# Patient Record
Sex: MALE
[De-identification: names, ages, dates, MRNs, and addresses within clinical notes are randomized per-mention and may not be internally consistent; named-entity substitution may affect disease eponyms.]

---

## 2020-03-15 ENCOUNTER — TRANSCRIPTION ENCOUNTER (OUTPATIENT)
Age: 50
End: 2020-03-15

## 2022-02-07 PROBLEM — Z00.00 ENCOUNTER FOR PREVENTIVE HEALTH EXAMINATION: Status: ACTIVE | Noted: 2022-02-07

## 2022-02-08 ENCOUNTER — APPOINTMENT (OUTPATIENT)
Dept: OTOLARYNGOLOGY | Facility: CLINIC | Age: 52
End: 2022-02-08
Payer: COMMERCIAL

## 2022-02-08 VITALS
BODY MASS INDEX: 27.59 KG/M2 | SYSTOLIC BLOOD PRESSURE: 148 MMHG | HEIGHT: 74 IN | WEIGHT: 215 LBS | TEMPERATURE: 98.6 F | HEART RATE: 96 BPM | DIASTOLIC BLOOD PRESSURE: 96 MMHG

## 2022-02-08 PROCEDURE — 92557 COMPREHENSIVE HEARING TEST: CPT

## 2022-02-08 PROCEDURE — 99204 OFFICE O/P NEW MOD 45 MIN: CPT

## 2022-02-08 PROCEDURE — 92567 TYMPANOMETRY: CPT

## 2022-02-08 RX ORDER — PREDNISONE 10 MG/1
10 TABLET ORAL
Qty: 45 | Refills: 0 | Status: ACTIVE | COMMUNITY
Start: 2022-02-08 | End: 1900-01-01

## 2022-02-08 RX ORDER — AMLODIPINE BESYLATE 10 MG/1
10 TABLET ORAL
Refills: 0 | Status: ACTIVE | COMMUNITY

## 2022-02-08 NOTE — ASSESSMENT
[FreeTextEntry1] : Patient with one week hx of sudden onset of right tinnitus - hissing.  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  Recommended MRi and also started on prednisone after all r and a discussed - follow up in about 2 weeks - if no better would consider eval for IT therapy.

## 2022-02-08 NOTE — HISTORY OF PRESENT ILLNESS
[de-identified] : c/o hissing in right ear for one week.  Started suddenly. No other sx. Does have prior hx of PND.  Hx of allergies in past.  No prior ear problems.  No change in hearing.  Tried cerumen drops without help. Not pulsatile.

## 2022-02-08 NOTE — DATA REVIEWED
[de-identified] : Asymmetry noted\par Left ear-Mild/mod HF SNHL; Type A\par Right ear-normal to mod.sev SNHL; Type A

## 2022-02-10 ENCOUNTER — EMERGENCY (EMERGENCY)
Facility: HOSPITAL | Age: 52
LOS: 0 days | Discharge: HOME | End: 2022-02-10
Attending: STUDENT IN AN ORGANIZED HEALTH CARE EDUCATION/TRAINING PROGRAM
Payer: COMMERCIAL

## 2022-02-10 VITALS
SYSTOLIC BLOOD PRESSURE: 137 MMHG | RESPIRATION RATE: 16 BRPM | OXYGEN SATURATION: 100 % | HEART RATE: 87 BPM | TEMPERATURE: 98 F | DIASTOLIC BLOOD PRESSURE: 81 MMHG

## 2022-02-10 VITALS
OXYGEN SATURATION: 100 % | DIASTOLIC BLOOD PRESSURE: 78 MMHG | RESPIRATION RATE: 17 BRPM | SYSTOLIC BLOOD PRESSURE: 130 MMHG | HEART RATE: 82 BPM | TEMPERATURE: 98 F

## 2022-02-10 DIAGNOSIS — T50.901A POISONING BY UNSPECIFIED DRUGS, MEDICAMENTS AND BIOLOGICAL SUBSTANCES, ACCIDENTAL (UNINTENTIONAL), INITIAL ENCOUNTER: ICD-10-CM

## 2022-02-10 DIAGNOSIS — T36.0X1A POISONING BY PENICILLINS, ACCIDENTAL (UNINTENTIONAL), INITIAL ENCOUNTER: ICD-10-CM

## 2022-02-10 DIAGNOSIS — I10 ESSENTIAL (PRIMARY) HYPERTENSION: ICD-10-CM

## 2022-02-10 DIAGNOSIS — R42 DIZZINESS AND GIDDINESS: ICD-10-CM

## 2022-02-10 DIAGNOSIS — E78.5 HYPERLIPIDEMIA, UNSPECIFIED: ICD-10-CM

## 2022-02-10 DIAGNOSIS — R07.9 CHEST PAIN, UNSPECIFIED: ICD-10-CM
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
## 2022-02-10 DIAGNOSIS — R10.9 UNSPECIFIED ABDOMINAL PAIN: ICD-10-CM

## 2022-02-10 LAB
ALBUMIN SERPL ELPH-MCNC: 3.7 G/DL — SIGNIFICANT CHANGE UP (ref 3.3–5)
ALP SERPL-CCNC: 68 U/L — SIGNIFICANT CHANGE UP (ref 40–120)
ALT FLD-CCNC: 24 U/L — SIGNIFICANT CHANGE UP (ref 12–78)
ANION GAP SERPL CALC-SCNC: 5 MMOL/L — SIGNIFICANT CHANGE UP (ref 5–17)
AST SERPL-CCNC: 11 U/L — LOW (ref 15–37)
BASOPHILS # BLD AUTO: 0.04 K/UL — SIGNIFICANT CHANGE UP (ref 0–0.2)
BASOPHILS NFR BLD AUTO: 0.6 % — SIGNIFICANT CHANGE UP (ref 0–2)
BILIRUB SERPL-MCNC: 0.3 MG/DL — SIGNIFICANT CHANGE UP (ref 0.2–1.2)
BUN SERPL-MCNC: 23 MG/DL — SIGNIFICANT CHANGE UP (ref 7–23)
CALCIUM SERPL-MCNC: 8.7 MG/DL — SIGNIFICANT CHANGE UP (ref 8.5–10.1)
CHLORIDE SERPL-SCNC: 108 MMOL/L — SIGNIFICANT CHANGE UP (ref 96–108)
CO2 SERPL-SCNC: 26 MMOL/L — SIGNIFICANT CHANGE UP (ref 22–31)
CREAT SERPL-MCNC: 1.21 MG/DL — SIGNIFICANT CHANGE UP (ref 0.5–1.3)
EOSINOPHIL # BLD AUTO: 0.08 K/UL — SIGNIFICANT CHANGE UP (ref 0–0.5)
EOSINOPHIL NFR BLD AUTO: 1.1 % — SIGNIFICANT CHANGE UP (ref 0–6)
GLUCOSE SERPL-MCNC: 110 MG/DL — HIGH (ref 70–99)
HCT VFR BLD CALC: 40.5 % — SIGNIFICANT CHANGE UP (ref 39–50)
HGB BLD-MCNC: 13.1 G/DL — SIGNIFICANT CHANGE UP (ref 13–17)
IMM GRANULOCYTES NFR BLD AUTO: 0.3 % — SIGNIFICANT CHANGE UP (ref 0–1.5)
LIDOCAIN IGE QN: 94 U/L — SIGNIFICANT CHANGE UP (ref 73–393)
LYMPHOCYTES # BLD AUTO: 2.25 K/UL — SIGNIFICANT CHANGE UP (ref 1–3.3)
LYMPHOCYTES # BLD AUTO: 31.9 % — SIGNIFICANT CHANGE UP (ref 13–44)
MAGNESIUM SERPL-MCNC: 2.4 MG/DL — SIGNIFICANT CHANGE UP (ref 1.6–2.6)
MCHC RBC-ENTMCNC: 27.4 PG — SIGNIFICANT CHANGE UP (ref 27–34)
MCHC RBC-ENTMCNC: 32.3 GM/DL — SIGNIFICANT CHANGE UP (ref 32–36)
MCV RBC AUTO: 84.7 FL — SIGNIFICANT CHANGE UP (ref 80–100)
MONOCYTES # BLD AUTO: 0.4 K/UL — SIGNIFICANT CHANGE UP (ref 0–0.9)
MONOCYTES NFR BLD AUTO: 5.7 % — SIGNIFICANT CHANGE UP (ref 2–14)
NEUTROPHILS # BLD AUTO: 4.27 K/UL — SIGNIFICANT CHANGE UP (ref 1.8–7.4)
NEUTROPHILS NFR BLD AUTO: 60.4 % — SIGNIFICANT CHANGE UP (ref 43–77)
PLATELET # BLD AUTO: 223 K/UL — SIGNIFICANT CHANGE UP (ref 150–400)
POTASSIUM SERPL-MCNC: 3.9 MMOL/L — SIGNIFICANT CHANGE UP (ref 3.5–5.3)
POTASSIUM SERPL-SCNC: 3.9 MMOL/L — SIGNIFICANT CHANGE UP (ref 3.5–5.3)
PROT SERPL-MCNC: 7 GM/DL — SIGNIFICANT CHANGE UP (ref 6–8.3)
RBC # BLD: 4.78 M/UL — SIGNIFICANT CHANGE UP (ref 4.2–5.8)
RBC # FLD: 13 % — SIGNIFICANT CHANGE UP (ref 10.3–14.5)
SODIUM SERPL-SCNC: 139 MMOL/L — SIGNIFICANT CHANGE UP (ref 135–145)
TROPONIN I, HIGH SENSITIVITY RESULT: <3 NG/L — SIGNIFICANT CHANGE UP
WBC # BLD: 7.06 K/UL — SIGNIFICANT CHANGE UP (ref 3.8–10.5)
WBC # FLD AUTO: 7.06 K/UL — SIGNIFICANT CHANGE UP (ref 3.8–10.5)

## 2022-02-10 PROCEDURE — 83735 ASSAY OF MAGNESIUM: CPT

## 2022-02-10 PROCEDURE — 99285 EMERGENCY DEPT VISIT HI MDM: CPT

## 2022-02-10 PROCEDURE — 99285 EMERGENCY DEPT VISIT HI MDM: CPT | Mod: 25

## 2022-02-10 PROCEDURE — 93005 ELECTROCARDIOGRAM TRACING: CPT

## 2022-02-10 PROCEDURE — 93010 ELECTROCARDIOGRAM REPORT: CPT

## 2022-02-10 PROCEDURE — 83690 ASSAY OF LIPASE: CPT

## 2022-02-10 PROCEDURE — 96374 THER/PROPH/DIAG INJ IV PUSH: CPT

## 2022-02-10 PROCEDURE — 36415 COLL VENOUS BLD VENIPUNCTURE: CPT

## 2022-02-10 PROCEDURE — 84484 ASSAY OF TROPONIN QUANT: CPT

## 2022-02-10 PROCEDURE — 80053 COMPREHEN METABOLIC PANEL: CPT

## 2022-02-10 PROCEDURE — 85025 COMPLETE CBC W/AUTO DIFF WBC: CPT

## 2022-02-10 PROCEDURE — 71045 X-RAY EXAM CHEST 1 VIEW: CPT | Mod: 26

## 2022-02-10 PROCEDURE — 71045 X-RAY EXAM CHEST 1 VIEW: CPT

## 2022-02-10 RX ORDER — FAMOTIDINE 10 MG/ML
20 INJECTION INTRAVENOUS ONCE
Refills: 0 | Status: COMPLETED | OUTPATIENT
Start: 2022-02-10 | End: 2022-02-10

## 2022-02-10 RX ORDER — SODIUM CHLORIDE 9 MG/ML
1000 INJECTION INTRAMUSCULAR; INTRAVENOUS; SUBCUTANEOUS ONCE
Refills: 0 | Status: COMPLETED | OUTPATIENT
Start: 2022-02-10 | End: 2022-02-10

## 2022-02-10 RX ADMIN — Medication 30 MILLILITER(S): at 22:15

## 2022-02-10 RX ADMIN — FAMOTIDINE 20 MILLIGRAM(S): 10 INJECTION INTRAVENOUS at 22:16

## 2022-02-10 RX ADMIN — SODIUM CHLORIDE 1000 MILLILITER(S): 9 INJECTION INTRAMUSCULAR; INTRAVENOUS; SUBCUTANEOUS at 22:14

## 2022-02-10 NOTE — CONSULT NOTE ADULT - SUBJECTIVE AND OBJECTIVE BOX
MEDICAL TOXICOLOGY CONSULT    HPI: Patient is a 52 year old male with past medical history significant for hypertension, anxiety, and dyslipidemia presents to the Emergency Department after an accidental ingestion.  Patient accidently took 5 tablets of his amlodipine 10 / olmesartan 40mg combination tablets approximately 13 hours prior  to presentation.  Patient states he confused them with the Medrol dose pack.  Patient reported mild dizziness after the ingestion, but no other physical complaints.  No co-ingestants reported.    ONSET / TIME of exposure(s): 13 hours prior to presentation    QUANTITY of exposure(s): 5 tablets of amlodipine 10mg / olmesartan 40mg combination    ROUTE of exposure:  ingestion    CONTEXT of exposure: at home    ASSOCIATED symptoms: anxiety    PAST MEDICAL & SURGICAL HISTORY:  Accidental overdose    FAMILY HISTORY: noncontributory    Vital Signs Last 24 Hrs  T(C): 36.5 (10 Feb 2022 20:18), Max: 36.5 (10 Feb 2022 20:18)  T(F): 97.7 (10 Feb 2022 20:18), Max: 97.7 (10 Feb 2022 20:18)  HR: 87 (10 Feb 2022 20:18) (87 - 87)  BP: 137/81 (10 Feb 2022 20:23) (137/81 - 137/81)  BP(mean): --  RR: 16 (10 Feb 2022 20:18) (16 - 16)  SpO2: 100% (10 Feb 2022 20:18) (100% - 100%)    SIGNIFICANT LABORATORY STUDIES:                         MEDICAL TOXICOLOGY CONSULT    HPI: Patient is a 52 year old male with past medical history significant for hypertension, anxiety, and dyslipidemia presents to the Emergency Department after an accidental ingestion.  Patient accidently took 5 tablets of his amlodipine 5 / olmesartan 40mg combination tablets approximately 13 hours prior  to presentation.  Patient states he confused them with the Medrol dose pack.  Patient reported mild dizziness after the ingestion, but no other physical complaints.  No co-ingestants reported.    ONSET / TIME of exposure(s): 13 hours prior to presentation    QUANTITY of exposure(s): 5 tablets of amlodipine 10mg / olmesartan 40mg combination    ROUTE of exposure:  ingestion    CONTEXT of exposure: at home    ASSOCIATED symptoms: anxiety    PAST MEDICAL & SURGICAL HISTORY:  Accidental overdose    FAMILY HISTORY: noncontributory    Vital Signs Last 24 Hrs  T(C): 36.5 (10 Feb 2022 20:18), Max: 36.5 (10 Feb 2022 20:18)  T(F): 97.7 (10 Feb 2022 20:18), Max: 97.7 (10 Feb 2022 20:18)  HR: 87 (10 Feb 2022 20:18) (87 - 87)  BP: 137/81 (10 Feb 2022 20:23) (137/81 - 137/81)  BP(mean): --  RR: 16 (10 Feb 2022 20:18) (16 - 16)  SpO2: 100% (10 Feb 2022 20:18) (100% - 100%)    SIGNIFICANT LABORATORY STUDIES:

## 2022-02-10 NOTE — CONSULT NOTE ADULT - TIME BILLING
Overdose on xenobiotic(s) necessitating emergency department intervention, laboratory & ECG evaluation, as well as and disposition recommendations by Med Tox service.
General

## 2022-02-10 NOTE — ED STATDOCS - OBJECTIVE STATEMENT
52 M history of HTN, Pre-DM, HLD here after accidently taking 5 pills of Amlodipine 5mg + Olmesartan 40 at 8 AM. He though he was taking his medrol dose sarah. he felt "off" though out the  day with mild headache, mild nausea, feeling "shaky" and cold then hot. he took some Tylenol and a xanax prior to arrival. he spoke with PMD and poison control,. patient also report several day of lower chest / upper abdominal pressure.    PMD Bandar 52 M history of HTN, Pre-DM, HLD here after accidently taking 5 pills of Amlodipine 5mg + Olmesartan 40 at 8 AM. He though he was taking his medrol dose sarah. he felt "off" though out the  day with mild headache, mild nausea, feeling "shaky" and cold then hot. he took some Tylenol and a xanax prior to arrival. he spoke with PMD and poison control. patient also report several days of lower chest / upper abdominal pressure.    PMD Bandar

## 2022-02-10 NOTE — CONSULT NOTE ADULT - ASSESSMENT
Patient is a 52 year old male with past medical history significant for hypertension, anxiety, and dyslipidemia presents to the Emergency Department after an accidental ingestion.     1. Calcium Channel Blocker Ingestion  - ingested 5 amlodipine 10mg / olmesartan 40mg combination tablets 13 hours prior to presentation  - no physical exam findings concerning for toxidrome  - vital signs and EKG within normal limits  - patient clear from a toxicological perspective

## 2022-02-10 NOTE — ED STATDOCS - NS ED ROS FT
Constitutional: No reported acute fever.  Neurological: Mild headache.  Eyes: No reported acute vision changes.   Ears, Nose, Mouth, Throat: No reported acute sore throat.  Cardiovascular: + chest pain.  Respiratory: No reported acute shortness of breath.  Gastrointestinal: No reported acute vomiting.  Genitourinary: No reported acute urinary problems.  Musculoskeletal: No reported acute extremity pain.  Integumentary (skin and/or breast): No reported acute rash.

## 2022-02-10 NOTE — ED STATDOCS - NSFOLLOWUPINSTRUCTIONS_ED_ALL_ED_FT
Please follow up with your Primary Care Physician and any specialists as discussed.  Please take your medications as prescribed.  If your symptoms persist or worsen, please seek care. Either return to the Emergency Department, go to urgent care or see your primary care doctor.  See refer to general information and follow up instructions below:     Accidental Drug Poisoning, Adult      Accidental drug poisoning happens when a person accidentally takes too much of a substance, such as a prescription medicine, an over-the-counter medicine, a vitamin, a supplement, or an illegal drug. The effects of drug poisoning can be mild, dangerous, or even deadly.      What are the causes?    This condition is caused by taking too much of a medicine, illegal drug, or other substance. It often results from:  •Lack of knowledge about a substance.      •Using more than one substance at the same time.      •An error made by the health care provider who prescribed the substance.      •An error made by the pharmacist who filled the prescription.      •A lapse in memory, such as forgetting that you have already taken a dose of the medicine.      •Suddenly using a substance after a long period of not using it.      The following substances and medicines are more likely to cause an accidental drug poisoning:  •Medicines that treat mental problems (psychotropic medicines).      •Pain medicines.      •Cocaine.      •Heroin.      •Multivitamins that contain iron.      •Over-the-counter cold and cough medicines.        What increases the risk?    This condition is more likely to occur in:•Elderly adults. Elderly adults are at risk because they may:  •Be taking many different medicines.      •Have difficulty reading labels.      •Forget when they last took their medicine.        •People who use illegal drugs.      •People who drink alcohol while using illegal drugs or certain medicines.      •People with certain mental health conditions.        What are the signs or symptoms?    Symptoms of this condition depend on the substance and the amount that was taken. Common symptoms include:  •Behavior changes, such as confusion.      •Sleepiness.      •Weakness.      •Slowed breathing.      •Nausea and vomiting.      •Seizures.      •Very large or small eye pupil size.      A drug poisoning can cause a very serious condition in which your blood pressure drops to a low level (shock). Symptoms of shock include:  •Cold and clammy skin.      •Pale skin.      •Blue lips.      •Very slow breathing.      •Extreme sleepiness.      •Severe confusion.      •Dizziness or fainting.        How is this diagnosed?    This condition is diagnosed based on:  •Your symptoms. You will be asked about the substances you took and when you took them.      •A physical exam.      You may also have other tests, including:  •Urine tests.      •Blood tests.      •An electrocardiogram (ECG).        How is this treated?    This condition may need to be treated right away at the hospital. Treatment may involve:  •Getting fluids and electrolytes through an IV.      •Having a breathing tube inserted in your airway (endotracheal tube) to help you breathe.    •Taking medicines. These may include medicines that:  •Absorb any substance that is in your digestive system.      •Block or reverse the effect of the substance that caused the drug poisoning.        •Having your blood filtered through an artificial kidney machine (hemodialysis).      •Ongoing counseling and mental health support. This may be provided if you used an illegal drug.        Follow these instructions at home:      Medicines      •Take over-the-counter and prescription medicines only as told by your health care provider.    •Before taking a new medicine, ask your health care provider whether the medicine:  •May cause side effects.      •Might react with other medicines.        •Keep a list of all the medicines that you take, including over-the-counter medicines, vitamins, supplements, and herbs. Bring this list with you to all of your medical visits.        General instructions      •Drink enough fluid to keep your urine pale yellow.      •If you are working with a counselor or mental health professional, make sure to follow his or her instructions.    • Do not drink alcohol if:  •Your health care provider tells you not to drink.      •You are pregnant, may be pregnant, or are planning to become pregnant.      •If you drink alcohol, limit how much you have:  •0–1 drink a day for women.      •0–2 drinks a day for men.        •Be aware of how much alcohol is in your drink. In the [place] one drink equals one typical bottle of beer (12 oz), one-half glass of wine (5 oz), or one shot of hard liquor (1½ oz).      •Keep all follow-up visits as told by your health care provider. This is important.        How is this prevented?   •Get help if you are struggling with:  •Alcohol or drug use.      •Depression or another mental health problem.        •Keep the phone number of your local poison control center near your phone or on your cell phone. The hotline of the [employer] of Poison Control Centers is (704) 113-4379.      •Store all medicines in safety containers that are out of the reach of children.      •Read the drug inserts that come with your medicines.      •Create a system for taking your medicine, such as a pillbox, that will help you avoid taking too much of the medicine.      • Do not drink alcohol while taking medicines unless your health care provider approves.      • Do not use illegal drugs.      • Do not take medicines that are not prescribed for you.        Contact a health care provider if:    •Your symptoms return.      •You develop new symptoms or side effects after taking a medicine.      •You have questions about possible drug poisoning. Call your local poison control center at (416) 516-3331.        Get help right away if:    •You think that you or someone else may have taken too much of a substance.      •You or someone else is having symptoms of drug poisoning.        Summary    •Accidental drug poisoning happens when a person accidentally takes too much of a substance, such as a prescription medicine, an over-the-counter medicine, a vitamin, a supplement, or an illegal drug.      •The effects of drug poisoning can be mild, dangerous, or even deadly.      •This condition is diagnosed based on your symptoms and a physical exam. You will be asked to tell your health care provider which substances you took and when you took them.      •This condition may need to be treated right away at the hospital.      This information is not intended to replace advice given to you by your health care provider. Make sure you discuss any questions you have with your health care provider.

## 2022-02-10 NOTE — ED STATDOCS - CLINICAL SUMMARY MEDICAL DECISION MAKING FREE TEXT BOX
BP med overdose this morning and several days of chest pain. check labs, ekg, monitor, x-ray, treat symptoms, discuss case with toxicology.

## 2022-02-10 NOTE — ED STATDOCS - PHYSICAL EXAMINATION
Vital signs as available reviewed.  General:  No acute distress.  Head:  Normocephalic, atraumatic.  Eyes:  Conjunctiva pink, no icterus.  Cardiovascular:  Regular rate, no obvious murmur.  Respiratory:  Clear to auscultation, good air entry bilaterally.  Abdomen:  Soft, non-tender.  Musculoskeletal:  No obvious deformity.  Neurologic: Alert and oriented, moving all extremities. CN 2-12 intact. No clonus.  Skin:  Warm and dry.

## 2022-02-10 NOTE — ED STATDOCS - PATIENT PORTAL LINK FT
You can access the FollowMyHealth Patient Portal offered by [facility] by registering at the following website: [email or web address]. By joining [facility]’s FollowMyHealth portal, you will also be able to view your health information using other applications (apps) compatible with our system.

## 2022-02-10 NOTE — CONSULT NOTE ADULT - ATTENDING COMMENTS
MD Freeman phone consultation:  patient encounter discussed at-length with the fellow, and I agree with the impression & plan.  Adult M with accidental CCB ingestion 13 hrs ago; currently asymptomatic; HR and BP normal in the ED.   No suicidal ideation.    Baseline dose of amlodipine 10mg/day.  Unlikely to develop significant toxicity if he has not exhibited hypotension/bradycardia at this point in time.    Can be medically cleared. MD Freeman phone consultation:  patient encounter discussed at-length with the fellow, and I agree with the impression & plan.  Adult M with accidental 25 mg amlodipine ingestion 13 hrs ago; currently asymptomatic; HR and BP normal in the ED.   No suicidal ideation.    Baseline dose of amlodipine 5 mg/day.  Unlikely to develop significant toxicity if he has not exhibited hypotension/bradycardia at this point in time.    Can be medically cleared.

## 2022-02-10 NOTE — ED ADULT NURSE NOTE - CHIEF COMPLAINT QUOTE
pt. accidentally took 5 pills of amlodipine/olmesartan 10/40mg at 8am. c/o dizziness, chest discomfort, headache and feeling of "swelling of the neck" BP in triage WDL.

## 2022-02-10 NOTE — ED ADULT NURSE NOTE - OBJECTIVE STATEMENT
pt presents to the ED s/p unintentional overdose at 8AM. pt took 5 pills of Amlodipine 5mg + Olmesartan 40 at 8 AM thinking they were his solumedrol pack. pt states throughout the day he was experiencing cold sweats, "shakiness" and dizziness. pt also reports mild chest discomfort. Pt currently awake and alert, following commands appropriately. ON CCM

## 2022-02-10 NOTE — ED STATDOCS - PROGRESS NOTE DETAILS
Case discussed with Tox, can be cleared from their stand point. KV: patient fells better, will discharge.

## 2022-02-23 ENCOUNTER — OUTPATIENT (OUTPATIENT)
Dept: OUTPATIENT SERVICES | Facility: HOSPITAL | Age: 52
LOS: 1 days | End: 2022-02-23
Payer: COMMERCIAL

## 2022-02-23 ENCOUNTER — APPOINTMENT (OUTPATIENT)
Dept: MRI IMAGING | Facility: CLINIC | Age: 52
End: 2022-02-23
Payer: COMMERCIAL

## 2022-02-23 DIAGNOSIS — H90.A21 SENSORINEURAL HEARING LOSS, UNILATERAL, RIGHT EAR, WITH RESTRICTED HEARING ON THE CONTRALATERAL SIDE: ICD-10-CM

## 2022-02-23 PROBLEM — I10 ESSENTIAL (PRIMARY) HYPERTENSION: Chronic | Status: ACTIVE | Noted: 2022-02-10

## 2022-02-23 PROBLEM — T50.901A POISONING BY UNSPECIFIED DRUGS, MEDICAMENTS AND BIOLOGICAL SUBSTANCES, ACCIDENTAL (UNINTENTIONAL), INITIAL ENCOUNTER: Chronic | Status: ACTIVE | Noted: 2022-02-10

## 2022-02-23 PROCEDURE — 70553 MRI BRAIN STEM W/O & W/DYE: CPT | Mod: 26

## 2022-02-23 PROCEDURE — A9585: CPT

## 2022-02-23 PROCEDURE — 70553 MRI BRAIN STEM W/O & W/DYE: CPT

## 2022-03-01 ENCOUNTER — NON-APPOINTMENT (OUTPATIENT)
Age: 52
End: 2022-03-01

## 2022-03-02 ENCOUNTER — APPOINTMENT (OUTPATIENT)
Dept: OTOLARYNGOLOGY | Facility: CLINIC | Age: 52
End: 2022-03-02
Payer: COMMERCIAL

## 2022-03-02 VITALS
TEMPERATURE: 98.8 F | SYSTOLIC BLOOD PRESSURE: 148 MMHG | WEIGHT: 215 LBS | BODY MASS INDEX: 27.59 KG/M2 | DIASTOLIC BLOOD PRESSURE: 96 MMHG | HEART RATE: 96 BPM | HEIGHT: 74 IN

## 2022-03-02 DIAGNOSIS — H93.11 TINNITUS, RIGHT EAR: ICD-10-CM

## 2022-03-02 DIAGNOSIS — H90.3 SENSORINEURAL HEARING LOSS, BILATERAL: ICD-10-CM

## 2022-03-02 DIAGNOSIS — H90.A21 SENSORINEURAL HEARING LOSS, UNILATERAL, RIGHT EAR, WITH RESTRICTED HEARING ON THE CONTRALATERAL SIDE: ICD-10-CM

## 2022-03-02 DIAGNOSIS — H91.8X9 OTHER SPECIFIED HEARING LOSS, UNSPECIFIED EAR: ICD-10-CM

## 2022-03-02 PROCEDURE — 92553 AUDIOMETRY AIR & BONE: CPT

## 2022-03-02 PROCEDURE — 99214 OFFICE O/P EST MOD 30 MIN: CPT

## 2022-03-02 RX ORDER — PREDNISONE 10 MG/1
10 TABLET ORAL
Qty: 45 | Refills: 0 | Status: ACTIVE | COMMUNITY
Start: 2022-03-02 | End: 1900-01-01

## 2022-03-02 NOTE — REVIEW OF SYSTEMS
[Ear Noises] : ear noises [Swollen Glands In The Neck] : swollen glands in the neck [Negative] : Endocrine [de-identified] : left side

## 2022-03-02 NOTE — HISTORY OF PRESENT ILLNESS
[de-identified] : Here for follow up of hissing in right ear.   Has had some improvement.  ? related to steroid treatment.   ? no change in hearing.   Did have neg MRI.

## 2022-03-02 NOTE — ASSESSMENT
[FreeTextEntry1] : Patient here for followup of tinnitus of right ear and asymmetric snhl - hearing improved and tinnitus (hissing) improved with prednisone. Only mild asymmetry now.  Will try further course of steroids.  If  no better may consider otology eval.  (note MRI was normal) - followup in 3 weeks.

## 2022-03-24 ENCOUNTER — APPOINTMENT (OUTPATIENT)
Dept: OTOLARYNGOLOGY | Facility: CLINIC | Age: 52
End: 2022-03-24

## 2022-05-02 ENCOUNTER — APPOINTMENT (OUTPATIENT)
Dept: OTOLARYNGOLOGY | Facility: CLINIC | Age: 52
End: 2022-05-02

## 2023-03-12 ENCOUNTER — OFFICE (OUTPATIENT)
Dept: URBAN - METROPOLITAN AREA CLINIC 12 | Facility: CLINIC | Age: 53
Setting detail: OPHTHALMOLOGY
End: 2023-03-12
Payer: COMMERCIAL

## 2023-03-12 DIAGNOSIS — H16.223: ICD-10-CM

## 2023-03-12 DIAGNOSIS — H10.433: ICD-10-CM

## 2023-03-12 PROCEDURE — 99213 OFFICE O/P EST LOW 20 MIN: CPT | Performed by: OPTOMETRIST

## 2023-03-12 ASSESSMENT — SUPERFICIAL PUNCTATE KERATITIS (SPK)
OS_SPK: 1+
OD_SPK: 1+

## 2023-03-12 ASSESSMENT — LID EXAM ASSESSMENTS
OS_COMMENTS: LUL 1+ AR
OS_MEIBOMITIS: LLL LUL 2+
OD_MEIBOMITIS: RLL RUL 1+
OD_COMMENTS: RUL 1+ AR

## 2023-03-12 ASSESSMENT — TONOMETRY
OS_IOP_MMHG: 19
OD_IOP_MMHG: 20

## 2023-03-12 ASSESSMENT — CONFRONTATIONAL VISUAL FIELD TEST (CVF)
OD_FINDINGS: FULL
OS_FINDINGS: FULL

## 2023-03-15 ASSESSMENT — REFRACTION_AUTOREFRACTION
OD_SPHERE: +1.00
OS_AXIS: 105
OD_CYLINDER: -1.00
OS_SPHERE: +1.25
OS_CYLINDER: -0.75
OD_AXIS: 085

## 2023-03-15 ASSESSMENT — SPHEQUIV_DERIVED
OS_SPHEQUIV: 0.875
OD_SPHEQUIV: 0.5

## 2023-03-15 ASSESSMENT — AXIALLENGTH_DERIVED
OS_AL: 23.167
OD_AL: 23.1308

## 2023-03-15 ASSESSMENT — KERATOMETRY
OS_K1POWER_DIOPTERS: 43.75
OS_AXISANGLE_DEGREES: 090
OD_AXISANGLE_DEGREES: 003
OD_K1POWER_DIOPTERS: 44.00
OS_K2POWER_DIOPTERS: 43.75
OD_K2POWER_DIOPTERS: 44.50

## 2023-03-15 ASSESSMENT — VISUAL ACUITY
OS_BCVA: 20/30-2
OD_BCVA: 20/30-2

## 2023-03-27 ENCOUNTER — RX ONLY (RX ONLY)
Age: 53
End: 2023-03-27

## 2023-03-27 ENCOUNTER — OFFICE (OUTPATIENT)
Dept: URBAN - METROPOLITAN AREA CLINIC 12 | Facility: CLINIC | Age: 53
Setting detail: OPHTHALMOLOGY
End: 2023-03-27
Payer: COMMERCIAL

## 2023-03-27 DIAGNOSIS — H10.45: ICD-10-CM

## 2023-03-27 DIAGNOSIS — H16.223: ICD-10-CM

## 2023-03-27 PROCEDURE — 99213 OFFICE O/P EST LOW 20 MIN: CPT | Performed by: OPTOMETRIST

## 2023-03-27 ASSESSMENT — REFRACTION_AUTOREFRACTION
OD_AXIS: 084
OS_CYLINDER: -1.00
OS_AXIS: 102
OD_CYLINDER: -1.00
OS_SPHERE: +1.50
OD_SPHERE: +1.00

## 2023-03-27 ASSESSMENT — LID EXAM ASSESSMENTS
OD_COMMENTS: RUL 1+ AR
OD_MEIBOMITIS: RLL RUL 1+
OS_COMMENTS: LUL 1+ AR
OS_MEIBOMITIS: LLL LUL 2+

## 2023-03-27 ASSESSMENT — KERATOMETRY
OS_K2POWER_DIOPTERS: 44.25
OD_K2POWER_DIOPTERS: 44.75
OD_K1POWER_DIOPTERS: 44.00
OS_K1POWER_DIOPTERS: 43.75
OD_AXISANGLE_DEGREES: 006
OS_AXISANGLE_DEGREES: 046

## 2023-03-27 ASSESSMENT — SPHEQUIV_DERIVED
OS_SPHEQUIV: 1
OD_SPHEQUIV: 0.5

## 2023-03-27 ASSESSMENT — TONOMETRY
OD_IOP_MMHG: 17
OS_IOP_MMHG: 17

## 2023-03-27 ASSESSMENT — VISUAL ACUITY
OS_BCVA: 20/25-
OD_BCVA: 20/20-

## 2023-03-27 ASSESSMENT — CONFRONTATIONAL VISUAL FIELD TEST (CVF)
OS_FINDINGS: FULL
OD_FINDINGS: FULL

## 2023-03-27 ASSESSMENT — AXIALLENGTH_DERIVED
OD_AL: 23.0868
OS_AL: 23.0322

## 2023-03-27 ASSESSMENT — SUPERFICIAL PUNCTATE KERATITIS (SPK)
OD_SPK: 1+
OS_SPK: 1+

## 2023-05-08 ENCOUNTER — OFFICE (OUTPATIENT)
Dept: URBAN - METROPOLITAN AREA CLINIC 12 | Facility: CLINIC | Age: 53
Setting detail: OPHTHALMOLOGY
End: 2023-05-08
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
## 2023-05-08 PROCEDURE — NO SHOW FE NO SHOW FEE: Performed by: OPTOMETRIST

## 2024-03-01 ENCOUNTER — APPOINTMENT (OUTPATIENT)
Dept: UROLOGY | Facility: CLINIC | Age: 54
End: 2024-03-01
Payer: COMMERCIAL

## 2024-03-01 VITALS
WEIGHT: 215 LBS | SYSTOLIC BLOOD PRESSURE: 133 MMHG | DIASTOLIC BLOOD PRESSURE: 83 MMHG | HEIGHT: 74 IN | BODY MASS INDEX: 27.59 KG/M2 | RESPIRATION RATE: 14 BRPM | OXYGEN SATURATION: 95 % | TEMPERATURE: 97.8 F | HEART RATE: 76 BPM

## 2024-03-01 PROCEDURE — 99244 OFF/OP CNSLTJ NEW/EST MOD 40: CPT

## 2024-03-01 NOTE — HISTORY OF PRESENT ILLNESS
[FreeTextEntry1] : Initial 03/01/2024: 55 y/o M patient was referred by his physician Benedicto Portillo MD, for evaluation of no onset urinary symptoms. Pt complained of gradual onset urinary frequency both day and night. He was not evaluated by a urologist before.

## 2024-03-01 NOTE — REVIEW OF SYSTEMS
[Negative] : Heme/Lymph [Feeling Tired] : feeling tired [Dry Eyes] : dryness of the eyes [Wake up at night to urinate  How many times?  ___] : wakes up to urinate [unfilled] times during the night [Poor quality erections] : Poor quality erections [FreeTextEntry2] : high blood pressure

## 2024-03-01 NOTE — PHYSICAL EXAM
Detail Level: Generalized Detail Level: Zone [Normal Appearance] : normal appearance [General Appearance - In No Acute Distress] : no acute distress [Well Groomed] : well groomed [Respiration, Rhythm And Depth] : normal respiratory rhythm and effort [Edema] : no peripheral edema [Abdomen Soft] : soft [Exaggerated Use Of Accessory Muscles For Inspiration] : no accessory muscle use [Abdomen Tenderness] : non-tender [Costovertebral Angle Tenderness] : no ~M costovertebral angle tenderness Detail Level: Detailed [Urinary Bladder Findings] : the bladder was normal on palpation [] : no rash [Normal Station and Gait] : the gait and station were normal for the patient's age [Oriented To Time, Place, And Person] : oriented to person, place, and time [Affect] : the affect was normal [No Focal Deficits] : no focal deficits [Mood] : the mood was normal [No Palpable Adenopathy] : no palpable adenopathy

## 2024-03-01 NOTE — END OF VISIT
[FreeTextEntry3] : Recommendations: I prescribed Tamsulosin HCl - 0.4 MG Oral Capsule; TAKE 1 CAPSULE AT BEDTIME. Pt will follow up with a transrectal ultrasound of the prostate gland in one month.

## 2024-03-01 NOTE — ADDENDUM
[FreeTextEntry1] : I, [place], acted as a scribe on behalf of Dr. Mathew 03/01/2024.   All medical record entries made by the Scribe were at my, Dr.Kip Mathew, direction and personally dictated by me on 03/01/2024. I have reviewed the chart and agree that the record accurately reflects my personal performance of the history, physical exam, assessment and plan. I have also personally directed, reviewed, and agreed with the chart.

## 2024-03-02 LAB
APPEARANCE: CLEAR
BACTERIA: NEGATIVE /HPF
BILIRUBIN URINE: NEGATIVE
BLOOD URINE: NEGATIVE
CAST: 0 /LPF
COLOR: YELLOW
EPITHELIAL CELLS: 0 /HPF
GLUCOSE QUALITATIVE U: NEGATIVE MG/DL
KETONES URINE: NEGATIVE MG/DL
LEUKOCYTE ESTERASE URINE: NEGATIVE
MICROSCOPIC-UA: NORMAL
NITRITE URINE: NEGATIVE
PH URINE: 6.5
PROTEIN URINE: NEGATIVE MG/DL
RED BLOOD CELLS URINE: 0 /HPF
SPECIFIC GRAVITY URINE: 1.02
UROBILINOGEN URINE: 0.2 MG/DL
WHITE BLOOD CELLS URINE: 0 /HPF

## 2024-03-04 LAB — BACTERIA UR CULT: NORMAL

## 2024-03-07 LAB — URINE CYTOLOGY: NORMAL

## 2024-03-29 ENCOUNTER — APPOINTMENT (OUTPATIENT)
Dept: UROLOGY | Facility: CLINIC | Age: 54
End: 2024-03-29
Payer: COMMERCIAL

## 2024-03-29 VITALS
WEIGHT: 215 LBS | DIASTOLIC BLOOD PRESSURE: 91 MMHG | HEIGHT: 74 IN | TEMPERATURE: 98 F | OXYGEN SATURATION: 96 % | SYSTOLIC BLOOD PRESSURE: 148 MMHG | HEART RATE: 92 BPM | RESPIRATION RATE: 14 BRPM | BODY MASS INDEX: 27.59 KG/M2

## 2024-03-29 DIAGNOSIS — N13.8 BENIGN PROSTATIC HYPERPLASIA WITH LOWER URINARY TRACT SYMPMS: ICD-10-CM

## 2024-03-29 DIAGNOSIS — N40.1 BENIGN PROSTATIC HYPERPLASIA WITH LOWER URINARY TRACT SYMPMS: ICD-10-CM

## 2024-03-29 DIAGNOSIS — R35.0 FREQUENCY OF MICTURITION: ICD-10-CM

## 2024-03-29 PROCEDURE — 99212 OFFICE O/P EST SF 10 MIN: CPT | Mod: 25

## 2024-03-29 PROCEDURE — 76872 US TRANSRECTAL: CPT

## 2024-03-29 PROCEDURE — 76857 US EXAM PELVIC LIMITED: CPT

## 2024-03-29 NOTE — PHYSICAL EXAM
[Normal Appearance] : normal appearance [General Appearance - In No Acute Distress] : no acute distress [Well Groomed] : well groomed [Edema] : no peripheral edema [Exaggerated Use Of Accessory Muscles For Inspiration] : no accessory muscle use [Respiration, Rhythm And Depth] : normal respiratory rhythm and effort [Abdomen Tenderness] : non-tender [Abdomen Soft] : soft [Costovertebral Angle Tenderness] : no ~M costovertebral angle tenderness [Urinary Bladder Findings] : the bladder was normal on palpation [Normal Station and Gait] : the gait and station were normal for the patient's age [] : no rash [No Focal Deficits] : no focal deficits [Oriented To Time, Place, And Person] : oriented to person, place, and time [Affect] : the affect was normal [Mood] : the mood was normal [No Palpable Adenopathy] : no palpable adenopathy

## 2024-03-29 NOTE — END OF VISIT
[FreeTextEntry3] : Recommendations: Pt to start Tamsulosin HCl - 0.4 MG Oral Capsule; TAKE 1 CAPSULE AT BEDTIME

## 2024-03-29 NOTE — HISTORY OF PRESENT ILLNESS
[FreeTextEntry1] : Follow-up 03/29/2024: 53 y/o M despite being prescribed Tamsulosin HCl - 0.4 MG Oral Capsule; TAKE 1 CAPSULE AT BEDTIME for his condition, patient had not commenced the medication at the time of the visit. An ultrasound conducted on the day of the visit revealed an elevated residual urine volume but was otherwise unremarkable.

## 2024-03-29 NOTE — ADDENDUM
[FreeTextEntry1] : I, [place], acted as a scribe on behalf of Dr. Mathew 03/29/2024.   All medical record entries made by the Scribe were at my, Dr.Kip Mathew, direction and personally dictated by me on 03/29/2024. I have reviewed the chart and agree that the record accurately reflects my personal performance of the history, physical exam, assessment, and plan. I have also personally directed, reviewed, and agreed with the chart.

## 2024-04-01 ENCOUNTER — RX CHANGE (OUTPATIENT)
Age: 54
End: 2024-04-01

## 2024-04-01 RX ORDER — TAMSULOSIN HYDROCHLORIDE 0.4 MG/1
0.4 CAPSULE ORAL
Qty: 90 | Refills: 3 | Status: ACTIVE | COMMUNITY
Start: 1900-01-01 | End: 1900-01-01

## 2024-04-01 RX ORDER — TAMSULOSIN HYDROCHLORIDE 0.4 MG/1
0.4 CAPSULE ORAL
Qty: 30 | Refills: 11 | Status: DISCONTINUED | COMMUNITY
Start: 2024-03-01 | End: 2024-04-01

## 2024-10-24 ENCOUNTER — RX RENEWAL (OUTPATIENT)
Age: 54
End: 2024-10-24

## 2025-07-31 ENCOUNTER — OFFICE (OUTPATIENT)
Dept: URBAN - METROPOLITAN AREA CLINIC 102 | Facility: CLINIC | Age: 55
Setting detail: OPHTHALMOLOGY
End: 2025-07-31
Payer: COMMERCIAL

## 2025-07-31 DIAGNOSIS — H01.005: ICD-10-CM

## 2025-07-31 DIAGNOSIS — H01.002: ICD-10-CM

## 2025-07-31 DIAGNOSIS — H10.45: ICD-10-CM

## 2025-07-31 DIAGNOSIS — H01.004: ICD-10-CM

## 2025-07-31 DIAGNOSIS — H16.223: ICD-10-CM

## 2025-07-31 DIAGNOSIS — H01.001: ICD-10-CM

## 2025-07-31 PROCEDURE — 92014 COMPRE OPH EXAM EST PT 1/>: CPT | Performed by: OPTOMETRIST

## 2025-07-31 ASSESSMENT — VISUAL ACUITY
OD_BCVA: 20/20
OS_BCVA: 20/20-1

## 2025-07-31 ASSESSMENT — TONOMETRY
OD_IOP_MMHG: 16
OS_IOP_MMHG: 16

## 2025-07-31 ASSESSMENT — CONFRONTATIONAL VISUAL FIELD TEST (CVF)
OS_FINDINGS: FULL
OD_FINDINGS: FULL